# Patient Record
Sex: MALE | Race: WHITE | NOT HISPANIC OR LATINO | Employment: FULL TIME | URBAN - METROPOLITAN AREA
[De-identification: names, ages, dates, MRNs, and addresses within clinical notes are randomized per-mention and may not be internally consistent; named-entity substitution may affect disease eponyms.]

---

## 2021-10-19 ENCOUNTER — NON-PROVIDER VISIT (OUTPATIENT)
Dept: OCCUPATIONAL MEDICINE | Facility: CLINIC | Age: 61
End: 2021-10-19

## 2021-10-19 DIAGNOSIS — Z02.1 PRE-EMPLOYMENT DRUG SCREENING: Primary | ICD-10-CM

## 2021-10-19 PROCEDURE — 8898 PR URINE 11 PANEL - SEND TO LAB: Performed by: PREVENTIVE MEDICINE

## 2022-05-15 ENCOUNTER — HOSPITAL ENCOUNTER (OUTPATIENT)
Dept: RADIOLOGY | Facility: MEDICAL CENTER | Age: 62
End: 2022-05-15
Attending: FAMILY MEDICINE
Payer: COMMERCIAL

## 2022-05-15 ENCOUNTER — OFFICE VISIT (OUTPATIENT)
Dept: URGENT CARE | Facility: PHYSICIAN GROUP | Age: 62
End: 2022-05-15
Payer: COMMERCIAL

## 2022-05-15 VITALS
TEMPERATURE: 98.1 F | OXYGEN SATURATION: 97 % | DIASTOLIC BLOOD PRESSURE: 98 MMHG | WEIGHT: 297.2 LBS | HEART RATE: 89 BPM | SYSTOLIC BLOOD PRESSURE: 162 MMHG | BODY MASS INDEX: 42.55 KG/M2 | RESPIRATION RATE: 15 BRPM | HEIGHT: 70 IN

## 2022-05-15 DIAGNOSIS — M25.561 ACUTE PAIN OF RIGHT KNEE: ICD-10-CM

## 2022-05-15 DIAGNOSIS — R03.0 ELEVATED BLOOD PRESSURE READING: ICD-10-CM

## 2022-05-15 PROCEDURE — 99204 OFFICE O/P NEW MOD 45 MIN: CPT | Performed by: FAMILY MEDICINE

## 2022-05-15 PROCEDURE — 73562 X-RAY EXAM OF KNEE 3: CPT | Mod: RT

## 2022-05-15 NOTE — PROGRESS NOTES
"Subjective:      Chief Complaint   Patient presents with   • Knee Pain               Knee Pain         Has hx chronic knee pain        No complains of worsening rt knee pain for 3 d.   Pain started after he \"twisted\" rt knee at home.   Describes pain as sharp.  Pain does not radiate.  Pain is somewhat better with motrin.                      History reviewed. No pertinent past medical history.      No current outpatient medications on file prior to visit.     No current facility-administered medications on file prior to visit.               Review of Systems   Constitutional: Negative for fever and malaise/fatigue.   Respiratory: Negative for shortness of breath.    Cardiovascular: Negative for chest pain.   Neurological: Negative for tingling.   All other systems reviewed and are negative.         Objective:     BP (!) 162/98   Pulse 89   Temp 36.7 °C (98.1 °F) (Temporal)   Resp 15   Ht 1.778 m (5' 10\")   Wt (!) 135 kg (297 lb 3.2 oz)   SpO2 97%     Physical Exam   Constitutional: patient is oriented to person, place, and time.  Patient appears well-developed and well-nourished. No distress.   HENT:   Head: Normocephalic and atraumatic.   Eyes: Conjunctivae are normal.   Cardiovascular: Normal rate.    Pulmonary/Chest: Effort normal.   Neurological: She is alert and oriented to person, place, and time.   musculoskeletal -      Rt Knee - tender over medial aspect .   There is no swelling.   There is no erythema.  There is no crepitus.  Varus and valgus stress tests negative. Lachman, anterior/posterior drawer test negative  Strength: Flexion 5/5, extension 5/5     Skin: Skin is warm. Patient is not diaphoretic. No erythema.   Nursing note and vitals reviewed.         Details    Reading Physician Reading Date Result Priority   Irineo Cruz M.D.  [phone] 5/15/2022 Urgent Care     Narrative & Impression     5/15/2022 11:40 AM     HISTORY/REASON FOR EXAM:  Atraumatic Pain/Swelling/Deformity.  Right knee " pain     TECHNIQUE/EXAM DESCRIPTION AND NUMBER OF VIEWS:  3 views of the RIGHT knee.     COMPARISON: None     FINDINGS:  There is no fracture.     Alignment is normal.     Tricompartmental degenerative changes are present.     IMPRESSION:     Tricompartmental right knee osteoarthritis             Exam Ended: 05/15/22 11:49 AM Last Resulted: 05/15/22 11:52 AM                  Assessment/Plan:     1. Acute pain of right knee  X-rays were personally reviewed by myself.   There is no fracture, just arthritic changes as noted above.       - diclofenac sodium (VOLTAREN) 1 % Gel; Apply 4 g topically every 8 hours as needed (pain).  Dispense: 100 g; Refill: 0    2. Elevated blood pressure reading  Likely secondary to pain  Pt does not have hx HTN  Advised f/u PCP

## 2022-05-15 NOTE — LETTER
May 15, 2022        Patient: Brandyn Fitch   YOB: 1960   Date of Visit: 5/15/2022           To Whom It May Concern:    It is my medical opinion that Brandyn Fitch may return to full duty immediately with use of cane.       If you have any questions or concerns, please don't hesitate to call.        Sincerely,          Peter Alvarado M.D.  Electronically Signed    [address] [id number] [phone] (Phone)  [phone] (Fax)

## 2022-07-26 ENCOUNTER — OFFICE VISIT (OUTPATIENT)
Dept: URGENT CARE | Facility: PHYSICIAN GROUP | Age: 62
End: 2022-07-26
Payer: COMMERCIAL

## 2022-07-26 VITALS
DIASTOLIC BLOOD PRESSURE: 98 MMHG | TEMPERATURE: 98.7 F | SYSTOLIC BLOOD PRESSURE: 170 MMHG | WEIGHT: 299 LBS | HEIGHT: 70 IN | BODY MASS INDEX: 42.8 KG/M2 | HEART RATE: 86 BPM | OXYGEN SATURATION: 96 % | RESPIRATION RATE: 18 BRPM

## 2022-07-26 DIAGNOSIS — U07.1 COVID-19 VIRUS INFECTION: ICD-10-CM

## 2022-07-26 DIAGNOSIS — R03.0 ELEVATED BLOOD PRESSURE READING: ICD-10-CM

## 2022-07-26 PROCEDURE — 99213 OFFICE O/P EST LOW 20 MIN: CPT | Performed by: PHYSICIAN ASSISTANT

## 2022-07-26 ASSESSMENT — ENCOUNTER SYMPTOMS
SHORTNESS OF BREATH: 0
COUGH: 0
CHILLS: 1
DIZZINESS: 0
BLURRED VISION: 0
SINUS PAIN: 0
ABDOMINAL PAIN: 0
DIARRHEA: 0
SORE THROAT: 0
FEVER: 1
HEADACHES: 1
NAUSEA: 1
VOMITING: 0
MYALGIAS: 1
PALPITATIONS: 0
EYE PAIN: 0

## 2022-07-26 NOTE — PROGRESS NOTES
Subjective     Luis Felipe Fitch is a 61 y.o. male who presents with Fever (Body aches, nausea, x 3 days. COVID+ on sunday)    HPI:  Luis Felipe Fitch is a 61 y.o. male who presents today for coronavirus.  On Sunday patient started to have body aches, fever up to 100.3 °F, nausea, and fatigue.  He took an [place] cold/flu medication which normally works when he gets viral upper respiratory infections but it did not help at all.  This prompted him to take a test for COVID-19 virus, which was positive.  He is here today wondering if he needs a repeat test and also requesting a note for work and possibly an antiviral medication.  He is vaccinated for COVID-19 virus but does not have any booster doses on board.      Review of Systems   Constitutional: Positive for chills, fever and malaise/fatigue.   HENT: Negative for congestion, ear pain, sinus pain and sore throat.    Eyes: Negative for blurred vision and pain.   Respiratory: Negative for cough and shortness of breath.    Cardiovascular: Negative for chest pain and palpitations.   Gastrointestinal: Positive for nausea. Negative for abdominal pain, diarrhea and vomiting.   Musculoskeletal: Positive for myalgias.   Skin: Negative for rash.   Neurological: Positive for headaches. Negative for dizziness.         PMH:  has no past medical history on file.  MEDS:   Current Outpatient Medications:   •  Nirmatrelvir & Ritonavir 20 x 150 MG & 10 x 100MG Tablet Therapy Pack, Take 300 mg nirmatrelvir (two 150 mg tablets) with 100 mg ritonavir (one 100 mg tablet) by mouth, with all three tablets taken together twice daily for 5 days., Disp: 30 Each, Rfl: 0  •  diclofenac sodium (VOLTAREN) 1 % Gel, Apply 4 g topically every 8 hours as needed (pain). (Patient not taking: Reported on 7/26/2022), Disp: 100 g, Rfl: 0  ALLERGIES: No Known Allergies  SURGHX: History reviewed. No pertinent surgical history.  SOCHX:  reports that he has never smoked. He has never used  "smokeless tobacco. He reports previous alcohol use. He reports that he does not use drugs.  FH: Family history was reviewed, no pertinent findings to report      Objective     BP (!) 170/98 (BP Location: Right arm, Patient Position: Sitting, BP Cuff Size: Adult long)   Pulse 86   Temp 37.1 °C (98.7 °F) (Temporal)   Resp 18   Ht 1.778 m (5' 10\")   Wt (!) 136 kg (299 lb)   SpO2 96%   BMI 42.90 kg/m²      Physical Exam  Constitutional:       Appearance: He is well-developed.   HENT:      Head: Normocephalic and atraumatic.      Right Ear: External ear normal.      Left Ear: External ear normal.      Nose: Mucosal edema and congestion present. No rhinorrhea.      Mouth/Throat:      Lips: Pink.      Mouth: Mucous membranes are moist.      Pharynx: Oropharynx is clear.   Eyes:      Conjunctiva/sclera: Conjunctivae normal.      Pupils: Pupils are equal, round, and reactive to light.   Cardiovascular:      Rate and Rhythm: Normal rate and regular rhythm.      Heart sounds: Normal heart sounds. No murmur heard.  Pulmonary:      Effort: Pulmonary effort is normal.      Breath sounds: Normal breath sounds. No decreased breath sounds, wheezing, rhonchi or rales.   Musculoskeletal:      Cervical back: Normal range of motion.   Lymphadenopathy:      Cervical: No cervical adenopathy.   Skin:     General: Skin is warm and dry.      Capillary Refill: Capillary refill takes less than 2 seconds.   Neurological:      Mental Status: He is alert and oriented to person, place, and time.   Psychiatric:         Behavior: Behavior normal.         Judgment: Judgment normal.           Assessment & Plan       1. COVID-19 virus infection  - Nirmatrelvir & Ritonavir 20 x 150 MG & 10 x 100MG Tablet Therapy Pack; Take 300 mg nirmatrelvir (two 150 mg tablets) with 100 mg  ritonavir (one 100 mg tablet) by mouth, with all three tablets taken together  twice daily for 5 days.  Dispense: 30 Each; Refill: 0  - OTC cold/flu medications  - PO " fluids  - Rest  - Tylenol or ibuprofen as needed for fever > 100.4 F  -Self-isolation instructions discussed per current CDC guidelines    2. Elevated blood pressure reading  -Follow-up with primary care provider            Differential Diagnosis, natural history, and supportive care discussed. Return to the Urgent Care or follow up with your PCP if symptoms fail to resolve, or for any new or worsening symptoms. Emergency room precautions discussed. Patient and/or family appears understanding of information.

## 2022-07-26 NOTE — LETTER
July 26, 2022         Patient: Luis Felipe Fitch   YOB: 1960   Date of Visit: 7/26/2022           To Whom it May Concern:    Luis Felipe Fitch was seen in my clinic on 7/26/2022.  Patient tested positive for COVID-19 virus.  He will need to self isolate at home per current CDC guidelines.  Thank you for making appropriate accommodations as he recovers.    If you have any questions or concerns, please don't hesitate to call.        Sincerely,           Ivette Che P.A.-C.  Electronically Signed

## 2023-09-27 ENCOUNTER — OFFICE VISIT (OUTPATIENT)
Dept: URGENT CARE | Facility: PHYSICIAN GROUP | Age: 63
End: 2023-09-27
Payer: COMMERCIAL

## 2023-09-27 VITALS
OXYGEN SATURATION: 97 % | RESPIRATION RATE: 18 BRPM | BODY MASS INDEX: 47.45 KG/M2 | SYSTOLIC BLOOD PRESSURE: 180 MMHG | TEMPERATURE: 98.5 F | WEIGHT: 315 LBS | HEART RATE: 92 BPM | DIASTOLIC BLOOD PRESSURE: 92 MMHG

## 2023-09-27 DIAGNOSIS — S76.312A STRAIN OF LEFT HAMSTRING, INITIAL ENCOUNTER: ICD-10-CM

## 2023-09-27 DIAGNOSIS — R03.0 ELEVATED BLOOD-PRESSURE READING WITHOUT DIAGNOSIS OF HYPERTENSION: ICD-10-CM

## 2023-09-27 PROCEDURE — 3077F SYST BP >= 140 MM HG: CPT | Performed by: PHYSICIAN ASSISTANT

## 2023-09-27 PROCEDURE — 99213 OFFICE O/P EST LOW 20 MIN: CPT | Performed by: PHYSICIAN ASSISTANT

## 2023-09-27 PROCEDURE — 3080F DIAST BP >= 90 MM HG: CPT | Performed by: PHYSICIAN ASSISTANT

## 2023-09-27 ASSESSMENT — ENCOUNTER SYMPTOMS
HEADACHES: 0
NAUSEA: 0
VOMITING: 0
DOUBLE VISION: 0
LEG PAIN: 1
BLURRED VISION: 0
SHORTNESS OF BREATH: 0

## 2023-09-27 NOTE — LETTER
September 27, 2023         Patient: Luis Felipe Fitch   YOB: 1960   Date of Visit: 9/27/2023           To Whom it May Concern:    Luis Felipe Fitch was seen in my clinic on 9/27/2023. Please excuse him from work 9/27. He may return to work on 9/28/2023.    If you have any questions or concerns, please don't hesitate to call.        Sincerely,           Garima Bhat P.A.-C.  Electronically Signed

## 2023-09-27 NOTE — PROGRESS NOTES
Subjective     Luis Felipe Fitch is a 63 y.o. male who presents with Leg Pain (Started having pain in back of left leg this morning and hasn't ne away)            This is a new problem.  The patient presents to clinic complaining of pain to his left posterior thigh.  The patient states he developed pain to his left posterior thigh earlier this morning.  The patient states that he had his left foot elevated on his bed and was attempting to remove a compression stocking that he wears on his ankles at night.  The patient states while attempting to remove the compression stocking he developed a pain to the posterior aspect of his left thigh.  The patient is unsure if he may have pulled and/or strained a muscle to his left thigh.  The patient reports no additional injuries and/or trauma to his left thigh region.  He also reports no associated swelling, bruising, or redness.  The patient denies back pain.  The patient states he is experiencing some increased pain with walking.  He reports no radiation of pain.  He also reports no numbness, tingling, weakness.  The patient has taken OTC Tylenol for his current symptoms.    Leg Pain  Pertinent negatives include no chest pain, headaches, nausea or vomiting.     PMH:  has no past medical history on file.  MEDS:   Current Outpatient Medications:     Nirmatrelvir & Ritonavir 20 x 150 MG & 10 x 100MG Tablet Therapy Pack, Take 300 mg nirmatrelvir (two 150 mg tablets) with 100 mg ritonavir (one 100 mg tablet) by mouth, with all three tablets taken together twice daily for 5 days. (Patient not taking: Reported on 9/27/2023), Disp: 30 Each, Rfl: 0    diclofenac sodium (VOLTAREN) 1 % Gel, Apply 4 g topically every 8 hours as needed (pain). (Patient not taking: Reported on 7/26/2022), Disp: 100 g, Rfl: 0  ALLERGIES: No Known Allergies  SURGHX: No past surgical history on file.  SOCHX:  reports that he has never smoked. He has never used smokeless tobacco. He reports that he does  not currently use alcohol. He reports that he does not use drugs.  FH: Family history was reviewed, no pertinent findings to report      Review of Systems   Eyes:  Negative for blurred vision and double vision.   Respiratory:  Negative for shortness of breath.    Cardiovascular:  Negative for chest pain.   Gastrointestinal:  Negative for nausea and vomiting.   Neurological:  Negative for headaches.              Objective     BP (!) 180/92   Pulse 92   Temp 36.9 °C (98.5 °F) (Temporal)   Resp 18   Wt (!) 150 kg (330 lb 11 oz)   SpO2 97%   BMI 47.45 kg/m²      Physical Exam  Constitutional:       General: He is not in acute distress.     Appearance: Normal appearance. He is well-developed. He is not ill-appearing.   HENT:      Head: Normocephalic and atraumatic.      Right Ear: External ear normal.      Left Ear: External ear normal.   Eyes:      Extraocular Movements: Extraocular movements intact.      Conjunctiva/sclera: Conjunctivae normal.   Cardiovascular:      Rate and Rhythm: Normal rate.   Pulmonary:      Effort: Pulmonary effort is normal.   Musculoskeletal:      Cervical back: Normal range of motion and neck supple.      Comments:   Left Thigh:  A localized area of tenderness is present to the distal posterior aspect of the left thigh overlying the hamstring.  No edema.  No ecchymosis.  No overlying erythema.  No increased warmth.  No open wounds/abrasions.  No rashes/lesions.  No additional tenderness of the left thigh.  ROM intact -the patient demonstrates full active range of motion of the left hip and left knee  Neurovascular intact distally  Strength 5/5  Antalgic gait   Skin:     General: Skin is warm and dry.   Neurological:      Mental Status: He is alert and oriented to person, place, and time.                             Assessment & Plan          1. Strain of left hamstring, initial encounter    The patient's presenting symptoms and physical exam findings are consistent with an acute strain  of the left hamstring.  Advised the patient to monitor for worsening signs or symptoms.  Recommend OTC medications and supportive care for symptomatic management.   Discussed return precautions with the patient, and he verbalized understanding.    Differential diagnoses, supportive care, and indications for immediate follow-up discussed with patient.   Instructed to return to clinic or nearest emergency department for any change in condition, further concerns, or worsening of symptoms.    OTC NSAIDs for pain/discomfort  Apply ice and or heat to the affected area for symptomatic relief  Gentle ROM/stretching exercises as discussed in clinic  Monitor worsening signs or symptoms  Follow-up with primary care  Return to clinic or go to the ED if symptoms worsen or fail to improve, or if the patient should develop worsening/increasing/persistent leg pain, swelling, bruising, increased redness warmth, decreased range of motion, difficulty walking, numbness, tingling, weakness of the extremities, fever/chills, and/or any concerning symptoms.    2. Elevated blood-pressure reading without diagnosis of hypertension  - Referral to establish with [facility] PCP    The patient's blood pressure was found to be elevated today in clinic.  The patient reports no formal diagnosis of high blood pressure, but states he has been told that his blood pressure has been elevated in the past.  The patient notes a family history of high blood pressure.  The patient states he was previously taking natural OTC supplements that are supposed to help with blood pressure, but states he has not taking any supplements in over a month.  The patient admits that he is currently in the worst shape of his life, and has recently gained weight.  The patient's blood pressure today in clinic was significantly elevated at 180/92.  The patient is currently not experiencing any symptoms related to his elevated blood pressure.  He reports no associated headache,  vision changes, vomiting, numbness, tingling, weakness of his extremities, chest pain, or shortness of breath.  We will place an urgent referral to establish with primary care.  Advised the patient to monitor his blood pressure at home and record a blood pressure log.  Discussed strict ED precautions with the patient, and he verbalized understanding.    I personally reviewed prior external notes and test results pertinent to today's visit.  I have independently reviewed and interpreted all diagnostics ordered during this urgent care visit.     Please note that this dictation was created using voice recognition software. I have made every reasonable attempt to correct obvious errors, but I expect that there may be errors of grammar and possibly content that I did not discover before finalizing the note.     This note was electronically signed by Garima Bhat PA-C

## 2023-10-03 ENCOUNTER — TELEPHONE (OUTPATIENT)
Dept: HEALTH INFORMATION MANAGEMENT | Facility: OTHER | Age: 63
End: 2023-10-03
Payer: COMMERCIAL

## 2025-02-05 ENCOUNTER — HOSPITAL ENCOUNTER (OUTPATIENT)
Dept: LAB | Facility: MEDICAL CENTER | Age: 65
End: 2025-02-05
Attending: FAMILY MEDICINE
Payer: COMMERCIAL

## 2025-02-05 LAB
BASOPHILS # BLD AUTO: 0.5 % (ref 0–1.8)
BASOPHILS # BLD: 0.03 K/UL (ref 0–0.12)
EOSINOPHIL # BLD AUTO: 0.11 K/UL (ref 0–0.51)
EOSINOPHIL NFR BLD: 1.8 % (ref 0–6.9)
ERYTHROCYTE [DISTWIDTH] IN BLOOD BY AUTOMATED COUNT: 46.4 FL (ref 35.9–50)
HCT VFR BLD AUTO: 48.3 % (ref 42–52)
HGB BLD-MCNC: 15.8 G/DL (ref 14–18)
IMM GRANULOCYTES # BLD AUTO: 0.03 K/UL (ref 0–0.11)
IMM GRANULOCYTES NFR BLD AUTO: 0.5 % (ref 0–0.9)
LYMPHOCYTES # BLD AUTO: 1.7 K/UL (ref 1–4.8)
LYMPHOCYTES NFR BLD: 27.3 % (ref 22–41)
MCH RBC QN AUTO: 29.7 PG (ref 27–33)
MCHC RBC AUTO-ENTMCNC: 32.7 G/DL (ref 32.3–36.5)
MCV RBC AUTO: 90.8 FL (ref 81.4–97.8)
MONOCYTES # BLD AUTO: 0.66 K/UL (ref 0–0.85)
MONOCYTES NFR BLD AUTO: 10.6 % (ref 0–13.4)
NEUTROPHILS # BLD AUTO: 3.69 K/UL (ref 1.82–7.42)
NEUTROPHILS NFR BLD: 59.3 % (ref 44–72)
NRBC # BLD AUTO: 0 K/UL
NRBC BLD-RTO: 0 /100 WBC (ref 0–0.2)
PLATELET # BLD AUTO: 208 K/UL (ref 164–446)
PMV BLD AUTO: 10.5 FL (ref 9–12.9)
RBC # BLD AUTO: 5.32 M/UL (ref 4.7–6.1)
WBC # BLD AUTO: 6.2 K/UL (ref 4.8–10.8)

## 2025-02-05 PROCEDURE — 84443 ASSAY THYROID STIM HORMONE: CPT

## 2025-02-05 PROCEDURE — 80061 LIPID PANEL: CPT

## 2025-02-05 PROCEDURE — 36415 COLL VENOUS BLD VENIPUNCTURE: CPT

## 2025-02-05 PROCEDURE — 84153 ASSAY OF PSA TOTAL: CPT

## 2025-02-05 PROCEDURE — 83036 HEMOGLOBIN GLYCOSYLATED A1C: CPT

## 2025-02-05 PROCEDURE — 80053 COMPREHEN METABOLIC PANEL: CPT

## 2025-02-05 PROCEDURE — 85025 COMPLETE CBC W/AUTO DIFF WBC: CPT

## 2025-02-06 LAB
ALBUMIN SERPL BCP-MCNC: 4.4 G/DL (ref 3.2–4.9)
ALBUMIN/GLOB SERPL: 1.4 G/DL
ALP SERPL-CCNC: 115 U/L (ref 30–99)
ALT SERPL-CCNC: 25 U/L (ref 2–50)
ANION GAP SERPL CALC-SCNC: 8 MMOL/L (ref 7–16)
AST SERPL-CCNC: 27 U/L (ref 12–45)
BILIRUB SERPL-MCNC: 0.6 MG/DL (ref 0.1–1.5)
BUN SERPL-MCNC: 17 MG/DL (ref 8–22)
CALCIUM ALBUM COR SERPL-MCNC: 9.6 MG/DL (ref 8.5–10.5)
CALCIUM SERPL-MCNC: 9.9 MG/DL (ref 8.5–10.5)
CHLORIDE SERPL-SCNC: 104 MMOL/L (ref 96–112)
CHOLEST SERPL-MCNC: 161 MG/DL (ref 100–199)
CO2 SERPL-SCNC: 26 MMOL/L (ref 20–33)
CREAT SERPL-MCNC: 0.89 MG/DL (ref 0.5–1.4)
FASTING STATUS PATIENT QL REPORTED: NORMAL
GFR SERPLBLD CREATININE-BSD FMLA CKD-EPI: 97 ML/MIN/1.73 M 2
GLOBULIN SER CALC-MCNC: 3.2 G/DL (ref 1.9–3.5)
GLUCOSE SERPL-MCNC: 99 MG/DL (ref 65–99)
HDLC SERPL-MCNC: 55 MG/DL
LDLC SERPL CALC-MCNC: 93 MG/DL
POTASSIUM SERPL-SCNC: 4.9 MMOL/L (ref 3.6–5.5)
PROT SERPL-MCNC: 7.6 G/DL (ref 6–8.2)
PSA SERPL DL<=0.01 NG/ML-MCNC: 0.88 NG/ML (ref 0–4)
SODIUM SERPL-SCNC: 138 MMOL/L (ref 135–145)
TRIGL SERPL-MCNC: 65 MG/DL (ref 0–149)
TSH SERPL-ACNC: 1.21 UIU/ML (ref 0.35–5.5)

## 2025-02-07 LAB
EST. AVERAGE GLUCOSE BLD GHB EST-MCNC: 134 MG/DL
HBA1C MFR BLD: 6.3 % (ref 4–5.6)